# Patient Record
Sex: MALE | Race: WHITE | NOT HISPANIC OR LATINO
[De-identification: names, ages, dates, MRNs, and addresses within clinical notes are randomized per-mention and may not be internally consistent; named-entity substitution may affect disease eponyms.]

---

## 2022-03-22 ENCOUNTER — TRANSCRIPTION ENCOUNTER (OUTPATIENT)
Age: 22
End: 2022-03-22

## 2024-03-05 ENCOUNTER — APPOINTMENT (OUTPATIENT)
Dept: INTERNAL MEDICINE | Facility: CLINIC | Age: 24
End: 2024-03-05
Payer: COMMERCIAL

## 2024-03-05 DIAGNOSIS — F42.9 OBSESSIVE-COMPULSIVE DISORDER, UNSPECIFIED: ICD-10-CM

## 2024-03-05 DIAGNOSIS — F41.9 ANXIETY DISORDER, UNSPECIFIED: ICD-10-CM

## 2024-03-05 DIAGNOSIS — F32.A ANXIETY DISORDER, UNSPECIFIED: ICD-10-CM

## 2024-03-05 PROBLEM — Z00.00 ENCOUNTER FOR PREVENTIVE HEALTH EXAMINATION: Status: ACTIVE | Noted: 2024-03-05

## 2024-03-05 PROCEDURE — 99203 OFFICE O/P NEW LOW 30 MIN: CPT

## 2024-03-05 RX ORDER — BUPROPION HYDROCHLORIDE 150 MG/1
150 TABLET, EXTENDED RELEASE ORAL DAILY
Qty: 30 | Refills: 3 | Status: ACTIVE | COMMUNITY
Start: 2024-03-05 | End: 1900-01-01

## 2024-03-05 RX ORDER — FLUOXETINE HYDROCHLORIDE 60 MG/1
60 TABLET ORAL
Qty: 30 | Refills: 0 | Status: ACTIVE | COMMUNITY
Start: 2024-03-05

## 2024-03-05 NOTE — HISTORY OF PRESENT ILLNESS
[Home] : at home, [unfilled] , at the time of the visit. [Medical Office: ([facility])___] : at the medical office located in  [Verbal consent obtained from patient] : the patient, [unfilled] [FreeTextEntry1] : Looking to establish care  Needs form filled out for school [de-identified] : Otherwise well, no complaints  -------------------------------------------------------------------------   10-minute telehealth encounter

## 2024-03-07 ENCOUNTER — TRANSCRIPTION ENCOUNTER (OUTPATIENT)
Age: 24
End: 2024-03-07

## 2024-03-21 ENCOUNTER — OUTPATIENT (OUTPATIENT)
Dept: OUTPATIENT SERVICES | Facility: HOSPITAL | Age: 24
LOS: 1 days | Discharge: STILL ADMITTED OR ADMINISTRATIVE | End: 2024-03-21

## 2024-03-22 ENCOUNTER — INPATIENT (INPATIENT)
Facility: HOSPITAL | Age: 24
LOS: 4 days | Discharge: HOME | End: 2024-03-27
Attending: STUDENT IN AN ORGANIZED HEALTH CARE EDUCATION/TRAINING PROGRAM | Admitting: STUDENT IN AN ORGANIZED HEALTH CARE EDUCATION/TRAINING PROGRAM
Payer: COMMERCIAL

## 2024-03-22 VITALS
HEART RATE: 83 BPM | DIASTOLIC BLOOD PRESSURE: 81 MMHG | OXYGEN SATURATION: 99 % | RESPIRATION RATE: 16 BRPM | TEMPERATURE: 98 F | SYSTOLIC BLOOD PRESSURE: 147 MMHG

## 2024-03-22 DIAGNOSIS — F32.9 MAJOR DEPRESSIVE DISORDER, SINGLE EPISODE, UNSPECIFIED: ICD-10-CM

## 2024-03-22 DIAGNOSIS — F42.2 MIXED OBSESSIONAL THOUGHTS AND ACTS: ICD-10-CM

## 2024-03-22 LAB
ALBUMIN SERPL ELPH-MCNC: 4.7 G/DL — SIGNIFICANT CHANGE UP (ref 3.3–5)
ALP SERPL-CCNC: 93 U/L — SIGNIFICANT CHANGE UP (ref 40–120)
ALT FLD-CCNC: 23 U/L — SIGNIFICANT CHANGE UP (ref 4–41)
AMPHET UR-MCNC: NEGATIVE — SIGNIFICANT CHANGE UP
ANION GAP SERPL CALC-SCNC: 14 MMOL/L — SIGNIFICANT CHANGE UP (ref 7–14)
APAP SERPL-MCNC: <10 UG/ML — LOW (ref 15–25)
APPEARANCE UR: ABNORMAL
AST SERPL-CCNC: 23 U/L — SIGNIFICANT CHANGE UP (ref 4–40)
BACTERIA # UR AUTO: NEGATIVE /HPF — SIGNIFICANT CHANGE UP
BARBITURATES UR SCN-MCNC: NEGATIVE — SIGNIFICANT CHANGE UP
BASOPHILS # BLD AUTO: 0.06 K/UL — SIGNIFICANT CHANGE UP (ref 0–0.2)
BASOPHILS NFR BLD AUTO: 0.6 % — SIGNIFICANT CHANGE UP (ref 0–2)
BENZODIAZ UR-MCNC: NEGATIVE — SIGNIFICANT CHANGE UP
BILIRUB SERPL-MCNC: 0.8 MG/DL — SIGNIFICANT CHANGE UP (ref 0.2–1.2)
BILIRUB UR-MCNC: NEGATIVE — SIGNIFICANT CHANGE UP
BUN SERPL-MCNC: 10 MG/DL — SIGNIFICANT CHANGE UP (ref 7–23)
CALCIUM SERPL-MCNC: 9.8 MG/DL — SIGNIFICANT CHANGE UP (ref 8.4–10.5)
CAST: 0 /LPF — SIGNIFICANT CHANGE UP (ref 0–4)
CHLORIDE SERPL-SCNC: 104 MMOL/L — SIGNIFICANT CHANGE UP (ref 98–107)
CO2 SERPL-SCNC: 22 MMOL/L — SIGNIFICANT CHANGE UP (ref 22–31)
COCAINE METAB.OTHER UR-MCNC: NEGATIVE — SIGNIFICANT CHANGE UP
COLOR SPEC: YELLOW — SIGNIFICANT CHANGE UP
CREAT SERPL-MCNC: 1.11 MG/DL — SIGNIFICANT CHANGE UP (ref 0.5–1.3)
CREATININE URINE RESULT, DAU: 229 MG/DL — SIGNIFICANT CHANGE UP
DIFF PNL FLD: NEGATIVE — SIGNIFICANT CHANGE UP
EGFR: 97 ML/MIN/1.73M2 — SIGNIFICANT CHANGE UP
EOSINOPHIL # BLD AUTO: 0.05 K/UL — SIGNIFICANT CHANGE UP (ref 0–0.5)
EOSINOPHIL NFR BLD AUTO: 0.5 % — SIGNIFICANT CHANGE UP (ref 0–6)
ETHANOL SERPL-MCNC: <10 MG/DL — SIGNIFICANT CHANGE UP
FLUAV AG NPH QL: SIGNIFICANT CHANGE UP
FLUBV AG NPH QL: SIGNIFICANT CHANGE UP
GLUCOSE SERPL-MCNC: 120 MG/DL — HIGH (ref 70–99)
GLUCOSE UR QL: NEGATIVE MG/DL — SIGNIFICANT CHANGE UP
HCG SERPL-ACNC: <1 MIU/ML — SIGNIFICANT CHANGE UP
HCT VFR BLD CALC: 41 % — SIGNIFICANT CHANGE UP (ref 39–50)
HGB BLD-MCNC: 13.8 G/DL — SIGNIFICANT CHANGE UP (ref 13–17)
IANC: 6.99 K/UL — SIGNIFICANT CHANGE UP (ref 1.8–7.4)
IMM GRANULOCYTES NFR BLD AUTO: 0.4 % — SIGNIFICANT CHANGE UP (ref 0–0.9)
KETONES UR-MCNC: NEGATIVE MG/DL — SIGNIFICANT CHANGE UP
LEUKOCYTE ESTERASE UR-ACNC: NEGATIVE — SIGNIFICANT CHANGE UP
LYMPHOCYTES # BLD AUTO: 1.9 K/UL — SIGNIFICANT CHANGE UP (ref 1–3.3)
LYMPHOCYTES # BLD AUTO: 19.5 % — SIGNIFICANT CHANGE UP (ref 13–44)
MCHC RBC-ENTMCNC: 30.5 PG — SIGNIFICANT CHANGE UP (ref 27–34)
MCHC RBC-ENTMCNC: 33.7 GM/DL — SIGNIFICANT CHANGE UP (ref 32–36)
MCV RBC AUTO: 90.7 FL — SIGNIFICANT CHANGE UP (ref 80–100)
METHADONE UR-MCNC: NEGATIVE — SIGNIFICANT CHANGE UP
MONOCYTES # BLD AUTO: 0.7 K/UL — SIGNIFICANT CHANGE UP (ref 0–0.9)
MONOCYTES NFR BLD AUTO: 7.2 % — SIGNIFICANT CHANGE UP (ref 2–14)
NEUTROPHILS # BLD AUTO: 6.99 K/UL — SIGNIFICANT CHANGE UP (ref 1.8–7.4)
NEUTROPHILS NFR BLD AUTO: 71.8 % — SIGNIFICANT CHANGE UP (ref 43–77)
NITRITE UR-MCNC: NEGATIVE — SIGNIFICANT CHANGE UP
NRBC # BLD: 0 /100 WBCS — SIGNIFICANT CHANGE UP (ref 0–0)
NRBC # FLD: 0 K/UL — SIGNIFICANT CHANGE UP (ref 0–0)
OPIATES UR-MCNC: NEGATIVE — SIGNIFICANT CHANGE UP
OXYCODONE UR-MCNC: NEGATIVE — SIGNIFICANT CHANGE UP
PCP SPEC-MCNC: SIGNIFICANT CHANGE UP
PCP UR-MCNC: NEGATIVE — SIGNIFICANT CHANGE UP
PH UR: 7.5 — SIGNIFICANT CHANGE UP (ref 5–8)
PLATELET # BLD AUTO: 208 K/UL — SIGNIFICANT CHANGE UP (ref 150–400)
POTASSIUM SERPL-MCNC: 4.2 MMOL/L — SIGNIFICANT CHANGE UP (ref 3.5–5.3)
POTASSIUM SERPL-SCNC: 4.2 MMOL/L — SIGNIFICANT CHANGE UP (ref 3.5–5.3)
PROT SERPL-MCNC: 7.8 G/DL — SIGNIFICANT CHANGE UP (ref 6–8.3)
PROT UR-MCNC: SIGNIFICANT CHANGE UP MG/DL
RBC # BLD: 4.52 M/UL — SIGNIFICANT CHANGE UP (ref 4.2–5.8)
RBC # FLD: 12.4 % — SIGNIFICANT CHANGE UP (ref 10.3–14.5)
RBC CASTS # UR COMP ASSIST: 2 /HPF — SIGNIFICANT CHANGE UP (ref 0–4)
RSV RNA NPH QL NAA+NON-PROBE: SIGNIFICANT CHANGE UP
SALICYLATES SERPL-MCNC: <0.3 MG/DL — LOW (ref 15–30)
SARS-COV-2 RNA SPEC QL NAA+PROBE: SIGNIFICANT CHANGE UP
SODIUM SERPL-SCNC: 140 MMOL/L — SIGNIFICANT CHANGE UP (ref 135–145)
SP GR SPEC: 1.03 — SIGNIFICANT CHANGE UP (ref 1–1.03)
SQUAMOUS # UR AUTO: 1 /HPF — SIGNIFICANT CHANGE UP (ref 0–5)
THC UR QL: POSITIVE
TOXICOLOGY SCREEN, DRUGS OF ABUSE, SERUM RESULT: SIGNIFICANT CHANGE UP
TSH SERPL-MCNC: 0.9 UIU/ML — SIGNIFICANT CHANGE UP (ref 0.27–4.2)
UROBILINOGEN FLD QL: 0.2 MG/DL — SIGNIFICANT CHANGE UP (ref 0.2–1)
WBC # BLD: 9.74 K/UL — SIGNIFICANT CHANGE UP (ref 3.8–10.5)
WBC # FLD AUTO: 9.74 K/UL — SIGNIFICANT CHANGE UP (ref 3.8–10.5)
WBC UR QL: 1 /HPF — SIGNIFICANT CHANGE UP (ref 0–5)

## 2024-03-22 PROCEDURE — 99285 EMERGENCY DEPT VISIT HI MDM: CPT

## 2024-03-22 PROCEDURE — 99285 EMERGENCY DEPT VISIT HI MDM: CPT | Mod: GC

## 2024-03-22 RX ORDER — FLUOXETINE HCL 10 MG
60 CAPSULE ORAL DAILY
Refills: 0 | Status: DISCONTINUED | OUTPATIENT
Start: 2024-03-22 | End: 2024-03-22

## 2024-03-22 RX ORDER — CLONAZEPAM 1 MG
1 TABLET ORAL ONCE
Refills: 0 | Status: DISCONTINUED | OUTPATIENT
Start: 2024-03-22 | End: 2024-03-22

## 2024-03-22 RX ORDER — HYDROXYZINE HCL 10 MG
50 TABLET ORAL EVERY 6 HOURS
Refills: 0 | Status: DISCONTINUED | OUTPATIENT
Start: 2024-03-22 | End: 2024-03-27

## 2024-03-22 RX ORDER — CLONAZEPAM 1 MG
0.5 TABLET ORAL
Refills: 0 | Status: DISCONTINUED | OUTPATIENT
Start: 2024-03-22 | End: 2024-03-25

## 2024-03-22 RX ORDER — BUPROPION HYDROCHLORIDE 150 MG/1
150 TABLET, EXTENDED RELEASE ORAL DAILY
Refills: 0 | Status: DISCONTINUED | OUTPATIENT
Start: 2024-03-22 | End: 2024-03-22

## 2024-03-22 RX ADMIN — Medication 1 MILLIGRAM(S): at 10:20

## 2024-03-22 RX ADMIN — Medication 0.5 MILLIGRAM(S): at 22:01

## 2024-03-22 RX ADMIN — Medication 1 MILLIGRAM(S): at 13:56

## 2024-03-22 NOTE — BH PATIENT PROFILE - FUNCTIONAL ASSESSMENT - BASIC MOBILITY 4.
Patient coughing with medication administration crushed in sauce and a drink  Dr Rubi Muhammad notified via tiger text and made NPO until cleared by ST  Patient going to MRI now  4 = No assist / stand by assistance

## 2024-03-22 NOTE — BH PATIENT PROFILE - STATED REASON FOR ADMISSION
I am really scared,  I don't belong here, making it all up in my head, I want to be with my girlfriend, reports passive SI, AH, He reports he does not want these thoughts.

## 2024-03-22 NOTE — BH CHART NOTE - NSEVENTNOTEFT_PSY_ALL_CORE
Patient seen briefly by attending MD after transfer from [facility] ED, full admit to follow tomorrow, in brief patient was highly anxious, describing intrusive thoughts related to self harm, harm to others. He notes he was previously taking prozac 60mg and Wellbutrin 150mg for over a year, but then stopped for at least 1 month, he was taking these for anxiety/OCD. He notes that while off meds he felt entirely himself and had no real anxiety or intrusive thoughts of this type. He then went to psych urgent care and told the provider of his meds who then provided prescriptions at those doses, there was no titration he simply went from 0 to 60mg of prozac in one day. Since that time pt has been extremely anxious with poor sleep, and worsening intrusive thoughts. Pt given Klonopin 1mg dose to great effect, will start klonopin 0.5mg BID then consider starting low dose lexapro later next week. Prozac and wellbutrin have been dc. Pt denies SI, denies urges to self injure.

## 2024-03-22 NOTE — ED ADULT NURSE NOTE - NSFALLUNIVINTERV_ED_ALL_ED
Bed/Stretcher in lowest position, wheels locked, appropriate side rails in place/Call bell, personal items and telephone in reach/Instruct patient to call for assistance before getting out of bed/chair/stretcher/Non-slip footwear applied when patient is off stretcher/[place] to call system/Physically safe environment - no spills, clutter or unnecessary equipment/Purposeful proactive rounding/Room/bathroom lighting operational, light cord in reach

## 2024-03-22 NOTE — ED ADULT TRIAGE NOTE - CHIEF COMPLAINT QUOTE
Pt arrives ambulatory to triage c/o suicidal ideation x1 wk. States they worsened since re-starting buproprion and fluoxetine 1.5 wks ago after being off these meds for 1 month. Recurrent thoughts about cutting self, shooting self (no firearm access). + recurrent thoughts about harming girlfriend (states he doesn't want to act on these thoughts). Denies hallucinations. + ETOH use socially. + marijuana use. Appears tearful in triage. PMHx: OCD, RICK, MDD.

## 2024-03-22 NOTE — ED PROVIDER NOTE - CLINICAL SUMMARY MEDICAL DECISION MAKING FREE TEXT BOX
Patient presents emergency department with severe worsening Isr[place] with intrusive thoughts said he wants to hurt others and himself although states very unlikely.  Patient states he never hurt in the past with no auditory visualizations secondary to not receiving his meds for over a month.  Patient to be voluntarily admitted for inpatient care.

## 2024-03-22 NOTE — ED BEHAVIORAL HEALTH ASSESSMENT NOTE - HPI (INCLUDE ILLNESS QUALITY, SEVERITY, DURATION, TIMING, CONTEXT, MODIFYING FACTORS, ASSOCIATED SIGNS AND SYMPTOMS)
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     Patient inconsolably crying and distress during the interview. Patient reports being diagnosed from OCD since age 9y/o. He reports being in treatment since then with Prozac, which has been prescribed by PCP who recently retired and was unable to renew prescription. Patient states not being in medication for a 1.5 months, he initially reports feeling well but in Saturday started to present symptoms. Patient reports since Saturday he started to experience intrusive violent images about stabbing/cutting self and family (mother and girlfriend) as well as shooting them with a gun (which denies having) after watching in the new a case were a  killed his wife. Patient reports these images happen most of the time in the day and are severely distressing to him as these "do not represent who he is" and is very scared. Patient states he has been isolating himself from friends, breathing and cracking fingers to get distracted from images. He reports feeling sad, anxious, not enjoying things as before, not going to school since Monday or to football practices. He reports poor sleep as he has been waking up with anxiety related to the thoughts and has poor appetite. Patient also reports passive thoughts about ending his life but states knowing "this is not the solution" and would never to anything to hurt himself or others. Patient denies any AH/VH, paranoia or referential thinking.     Collateral [place]: She reports patient goes to [employer] and has a scholarship in football. She reports patient was diagnosed of OCD, depression, anxiety when he was 9 y/o and was following up with pediatrician. She reports patient used to have obsessions related to numbers and patterns but now intrusive thoughts change depending on the situation. She reports being originally from [place], and states pediatrician retired after following patient for 20 years. She state patient stopped medication 4-5 weeks, but when back 1.5 weeks ago. She states patient is on Prozac 60mg PO and Wellbutrin (unclear about the dose). She reports on Monday, he called her stating he was having thoughts, fearing hurting someone (particularly girlfriend) or self, not being able to function in school or job. She states patient has been stating being scared and not fully trusting self, but denies any passive or active SI thoughts. Last episode during Covid, but did not went to the ED. Patient was never being admitted to IPP unit before. She states patient has an appointment with a psychiatrist Dr. Alexandra Olvera this Wednesday.

## 2024-03-22 NOTE — ED BEHAVIORAL HEALTH ASSESSMENT NOTE - DESCRIPTION
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 When is at home lives with mother and mom's boyfriend

## 2024-03-22 NOTE — ED BEHAVIORAL HEALTH ASSESSMENT NOTE - VIOLENCE PROTECTIVE FACTORS:
Residential stability/Relationship stability/Employment stability/Engagement in treatment/Affective Stability/Insight into violence risk and need for management/treatment

## 2024-03-22 NOTE — ED BEHAVIORAL HEALTH ASSESSMENT NOTE - PSYCHIATRIC ISSUES AND PLAN (INCLUDE STANDING AND PRN MEDICATION)
continue with home medications Prozac 60mg PO daily and Wellbutrin 150mg PO daily. Atarax 50mg PO q6 for anxiety, Ativan 2mg PO/IM q6 for severe anxiety/agitation.

## 2024-03-22 NOTE — ED ADULT NURSE NOTE - OBJECTIVE STATEMENT
Received pt in BH pt tearful c/o depression & instructive thoughts currently denies si/hi emotional support provided labs/covid collected safety & comfort measures maintained eval on going.

## 2024-03-22 NOTE — ED BEHAVIORAL HEALTH ASSESSMENT NOTE - RISK ASSESSMENT
Acute Risk: depressed mood/anhedonia, hopelessness/despair, severe anxiety,  panic,  global insomnia, change in provider or treatment,    Chronic Risk: mood disorder, substance use disorder    Protective factors include: Young, healthy, denies SI/I/P, no history of suicide attempts, no history of NSSIB, identifies reasons for living, fear of death/dying due to pain/suffering, future oriented, no prior psychiatric admissions, engaged in work or school, stable housing, strong social supports, engaged in treatment, ability to cope with stress, high frustration tolerance, medication/follow up compliance, help-seeking behaviors, no legal history, adequate outpatient follow up with motivation to participate in care.

## 2024-03-22 NOTE — ED BEHAVIORAL HEALTH ASSESSMENT NOTE - SUMMARY
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     Patient presenting violent intrusive thoughts of hurting self and family with depressive and anxious mood for the last week in the context of medication discontinuation. Patient re started medication 1.5 weeks ago; however continues to reports distressing symptoms causing poor functioning. Patient reports passive SI though denies any plan or intent. He also denies intent to follow intrusive thoughts. Patient meets criteria for voluntary admission. Patient is a 22 y/o single male, college student in [employer], living at the dorms in college, with past psychiatric history of OCD, MDD and anxiety, no past inpatient admission, no past SA/NSSIB, currently in treatment with PCP but not psychiatrist, use of daily MJ, no past legal or violent history, no trauma history, no significant medical history who BIB self after worsening OCD symptoms for the last week in the context of recent discontinuation of medication.     Patient presenting violent intrusive thoughts of hurting self and family with depressive and anxious mood for the last week in the context of medication discontinuation. Patient re started medication 1.5 weeks ago; however continues to reports distressing symptoms causing poor functioning. Patient reports passive SI though denies any plan or intent. He also denies intent to follow intrusive thoughts. Patient meets criteria for voluntary admission.    PLAN:  - Admit patient 9.13   - No CO 1:1  - psychiatry: continue with home medications Prozac 60mg PO daily and Wellbutrin 150mg PO daily  - PRNs: Atarax 50mg PO q6 for anxiety, Ativan 2mg PO/IM q6 for severe anxiety/agitation.

## 2024-03-22 NOTE — ED BEHAVIORAL HEALTH ASSESSMENT NOTE - NSBHATTESTCOMMENTATTENDFT_PSY_A_CORE
Patient is a 22 y/o single male, college student in [employer], living at the dorms in college, with past psychiatric history of OCD, MDD and anxiety, no past inpatient admission, no past SA/NSSIB, currently in treatment with PCP but not psychiatrist, use of daily MJ, no past legal or violent history, no trauma history, no significant medical history who BIB self after worsening OCD symptoms for the last week in the context of recent discontinuation of medication.     Patient presenting violent intrusive thoughts of hurting self and family with depressive and anxious mood for the last week in the context of medication discontinuation. Patient re started medication 1.5 weeks ago; however continues to reports distressing symptoms causing poor functioning. Patient reports passive SI though denies any plan or intent. He also denies intent to follow intrusive thoughts. Patient meets criteria for voluntary admission.

## 2024-03-22 NOTE — ED BEHAVIORAL HEALTH ASSESSMENT NOTE - NSBHMSEIMPULSE_PSY_A_CORE
Spoke to Sindhu, medication is not covered at [employer]. Script will need to be sent to [facility]. See telephone encounter 03/16/2021, PA already approved.        To initiate an authorization request for this medication, please contact [facility] at [phone] [place]

## 2024-03-22 NOTE — ED BEHAVIORAL HEALTH ASSESSMENT NOTE - DETAILS
mother OCD on Prozac and Wellbutrin and father MDD see HPI DARRENM to Dr. Fredis MEJIA Patient admitted to [facility]

## 2024-03-22 NOTE — ED PROVIDER NOTE - OBJECTIVE STATEMENT
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     Patient inconsolably crying and distress during the interview. Patient reports being diagnosed from OCD since age 11y/o. He reports being in treatment since then with Prozac, which has been prescribed by PCP who recently retired and was unable to renew prescription. Patient states not being in medication for a 1.5 months, he initially reports feeling well but in Saturday started to present symptoms. Patient reports since Saturday he started to experience intrusive violent images about stabbing/cutting self and family (mother and girlfriend) as well as shooting them with a gun (which denies having) after watching in the new a case were a  killed his wife. Patient reports these images happen most of the time in the day and are severely distressing to him as these "do not represent who he is" and is very scared. Patient states he has been isolating himself from friends, breathing and cracking fingers to get distracted from images. He reports feeling sad, anxious, not enjoying things as before, not going to school since Monday or to football practices. He reports poor sleep as he has been waking up with anxiety related to the thoughts and has poor appetite. Patient also reports passive thoughts about ending his life but states knowing "this is not the solution" and would never to anything to hurt himself or others. Patient denies any AH/VH, paranoia or referential thinking.     Collateral [place]: She reports patient goes to [employer] and has a scholarship in football. She reports patient was diagnosed of OCD, depression, anxiety when he was 9 y/o and was following up with pediatrician. She reports patient used to have obsessions related to numbers and patterns but now intrusive thoughts change depending on the situation. She reports being originally from [place], and states pediatrician retired after following patient for 20 years. She state patient stopped medication 4-5 weeks, but when back 1.5 weeks ago. She states patient is on Prozac 60mg PO and Wellbutrin (unclear about the dose). She reports on Monday, he called her stating he was having thoughts, fearing hurting someone (particularly girlfriend) or self, not being able to function in school or job. She states patient has been stating being scared and not fully trusting self, but denies any passive or active SI thoughts. Last episode during Covid, but did not went to the ED. Patient was never being admitted to IPP unit before. She states patient has an appointment with a psychiatrist Dr. Alexandra Olvera this Wednesday.

## 2024-03-22 NOTE — BH PATIENT PROFILE - FALL HARM RISK - UNIVERSAL INTERVENTIONS
Bed in lowest position, wheels locked, appropriate side rails in place/Call bell, personal items and telephone in reach/Instruct patient to call for assistance before getting out of bed or chair/Non-slip footwear when patient is out of bed/[place] to call system/Physically safe environment - no spills, clutter or unnecessary equipment/Purposeful Proactive Rounding/Room/bathroom lighting operational, light cord in reach

## 2024-03-23 PROCEDURE — 99221 1ST HOSP IP/OBS SF/LOW 40: CPT

## 2024-03-23 RX ADMIN — Medication 0.5 MILLIGRAM(S): at 08:01

## 2024-03-23 RX ADMIN — Medication 0.5 MILLIGRAM(S): at 21:05

## 2024-03-23 RX ADMIN — Medication 2 MILLIGRAM(S): at 10:19

## 2024-03-23 RX ADMIN — Medication 50 MILLIGRAM(S): at 21:05

## 2024-03-23 NOTE — BH INPATIENT PSYCHIATRY ASSESSMENT NOTE - NSBHMETABOLIC_PSY_ALL_CORE_FT
BMI:   HbA1c:   Glucose:   BP: 149/85 (03-22-24 @ 12:08) (147/81 - 149/85)Vital Signs Last 24 Hrs  T(C): 36.7 (03-23-24 @ 08:40), Max: 36.7 (03-23-24 @ 08:40)  T(F): 98 (03-23-24 @ 08:40), Max: 98 (03-23-24 @ 08:40)  HR: --  BP: --  BP(mean): --  RR: 18 (03-23-24 @ 08:40) (18 - 18)  SpO2: --    Orthostatic VS  03-23-24 @ 08:40  Lying BP: --/-- HR: --  Sitting BP: 133/80 HR: 98  Standing BP: 127/88 HR: 97  Site: upper right arm  Mode: electronic  Orthostatic VS  03-22-24 @ 14:06  Lying BP: --/-- HR: --  Sitting BP: 131/89 HR: 82  Standing BP: 140/95 HR: 89  Site: --  Mode: --    Lipid Panel:  BMI:   HbA1c:   Glucose:   BP: 149/85 (03-22-24 @ 12:08) (147/81 - 149/85)Vital Signs Last 24 Hrs  T(C): 37 (03-23-24 @ 20:32), Max: 37 (03-23-24 @ 20:32)  T(F): 98.6 (03-23-24 @ 20:32), Max: 98.6 (03-23-24 @ 20:32)  HR: --  BP: --  BP(mean): --  RR: 18 (03-23-24 @ 08:40) (18 - 18)  SpO2: --    Orthostatic VS  03-23-24 @ 08:40  Lying BP: --/-- HR: --  Sitting BP: 133/80 HR: 98  Standing BP: 127/88 HR: 97  Site: upper right arm  Mode: electronic  Orthostatic VS  03-22-24 @ 14:06  Lying BP: --/-- HR: --  Sitting BP: 131/89 HR: 82  Standing BP: 140/95 HR: 89  Site: --  Mode: --    Lipid Panel:

## 2024-03-23 NOTE — BH INPATIENT PSYCHIATRY ASSESSMENT NOTE - NSBHCRANIAL_PSY_ALL_CORE
Recognizes 2 fingers or can read (II)/Normal speech (IX, X, XII)/Extraocular Eye Movement Intact  (III, IV, VI)/Shoulder shrug (XI)/Hearing intact (VIII)

## 2024-03-23 NOTE — BH INPATIENT PSYCHIATRY ASSESSMENT NOTE - DESCRIPTION
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 When is at home lives with mother and mom's boyfriend

## 2024-03-23 NOTE — BH INPATIENT PSYCHIATRY ASSESSMENT NOTE - HPI (INCLUDE ILLNESS QUALITY, SEVERITY, DURATION, TIMING, CONTEXT, MODIFYING FACTORS, ASSOCIATED SIGNS AND SYMPTOMS)
Patient is a 22 y/o single male, college student in [employer], living at the dorms in college, with past psychiatric history of OCD, MDD and anxiety, no past inpatient admission, no past SA/NSSIB, currently in treatment with PCP but not psychiatrist, use of daily MJ, no past legal or violent history, no trauma history, no significant medical history who BIB self after worsening OCD symptoms for the last week in the context of recent discontinuation of medication.     Patient inconsolably crying and distress during the interview. Patient reports being diagnosed from OCD since age 9y/o. He reports being in treatment since then with Prozac, which has been prescribed by PCP who recently retired and was unable to renew prescription. Patient states not being in medication for a 1.5 months, he initially reports feeling well but in Saturday started to present symptoms. Patient reports since Saturday he started to experience intrusive violent images about stabbing/cutting self and family (mother and girlfriend) as well as shooting them with a gun (which denies having) after watching in the new a case were a  killed his wife. Patient reports these images happen most of the time in the day and are severely distressing to him as these "do not represent who he is" and is very scared. Patient states he has been isolating himself from friends, breathing and cracking fingers to get distracted from images. He reports feeling sad, anxious, not enjoying things as before, not going to school since Monday or to football practices. He reports poor sleep as he has been waking up with anxiety related to the thoughts and has poor appetite. Patient also reports passive thoughts about ending his life but states knowing "this is not the solution" and would never to anything to hurt himself or others. Patient denies any AH/VH, paranoia or referential thinking.     Collateral [place]: She reports patient goes to [employer] and has a scholarship in football. She reports patient was diagnosed of OCD, depression, anxiety when he was 9 y/o and was following up with pediatrician. She reports patient used to have obsessions related to numbers and patterns but now intrusive thoughts change depending on the situation. She reports being originally from [place], and states pediatrician retired after following patient for 20 years. She state patient stopped medication 4-5 weeks, but when back 1.5 weeks ago. She states patient is on Prozac 60mg PO and Wellbutrin (unclear about the dose). She reports on Monday, he called her stating he was having thoughts, fearing hurting someone (particularly girlfriend) or self, not being able to function in school or job. She states patient has been stating being scared and not fully trusting self, but denies any passive or active SI thoughts. Last episode during Covid, but did not went to the ED. Patient was never being admitted to IPP unit before. She states patient has an appointment with a psychiatrist Dr. Alexandra Olvera this Wednesday. As per  ED eval:  "Patient is a 24 y/o single male, college student in [employer], living at the dorms in college, with past psychiatric history of OCD, MDD and anxiety, no past inpatient admission, no past SA/NSSIB, currently in treatment with PCP but not psychiatrist, use of daily MJ, no past legal or violent history, no trauma history, no significant medical history who BIB self after worsening OCD symptoms for the last week in the context of recent discontinuation of medication.     Patient inconsolably crying and distress during the interview. Patient reports being diagnosed from OCD since age 11y/o. He reports being in treatment since then with Prozac, which has been prescribed by PCP who recently retired and was unable to renew prescription. Patient states not being in medication for a 1.5 months, he initially reports feeling well but in Saturday started to present symptoms. Patient reports since Saturday he started to experience intrusive violent images about stabbing/cutting self and family (mother and girlfriend) as well as shooting them with a gun (which denies having) after watching in the new a case were a  killed his wife. Patient reports these images happen most of the time in the day and are severely distressing to him as these "do not represent who he is" and is very scared. Patient states he has been isolating himself from friends, breathing and cracking fingers to get distracted from images. He reports feeling sad, anxious, not enjoying things as before, not going to school since Monday or to football practices. He reports poor sleep as he has been waking up with anxiety related to the thoughts and has poor appetite. Patient also reports passive thoughts about ending his life but states knowing "this is not the solution" and would never to anything to hurt himself or others. Patient denies any AH/VH, paranoia or referential thinking.     [place]ateral [place]: She reports patient goes to [employer] and has a scholarship in football. She reports patient was diagnosed of OCD, depression, anxiety when he was 9 y/o and was following up with pediatrician. She reports patient used to have obsessions related to numbers and patterns but now intrusive thoughts change depending on the situation. She reports being originally from [place], and states pediatrician retired after following patient for 20 years. She state patient stopped medication 4-5 weeks, but when back 1.5 weeks ago. She states patient is on Prozac 60mg PO and Wellbutrin (unclear about the dose). She reports on Monday, he called her stating he was having thoughts, fearing hurting someone (particularly girlfriend) or self, not being able to function in school or job. She states patient has been stating being scared and not fully trusting self, but denies any passive or active SI thoughts. Last episode during Covid, but did not went to the ED. Patient was never being admitted to IPP unit before. She states patient has an appointment with a psychiatrist Dr. Alexandra Olvera this Wednesday."    As per nursing, pt is extremely anxious and has had trouble tolerating this anxiety overnight.   On interview, the patient is found sitting on his bed stating that he's "scared." He says that the SI "pops up here and there" but has no intent or plan. He feels hopeless because "nothing helps." He denies any trouble with sleep or appetite. He's been considering breaking up with his girlfriend because of feeling overwhelmed.

## 2024-03-23 NOTE — PSYCHIATRIC REHAB INITIAL EVALUATION - NSBHPRRECOMMEND_PSY_ALL_CORE
Writer met with pt to orient him to the unit and to establish a collaborative psychiatric goal. Pt was receptive to speaking with writer and presented as anxious, distressed but with good insight. Pt reported reason for admission as “Since being off my medication a month ago I've been having thoughts that aren't like me. I would never hurt anyone or myself but my OCD makes me worry that I might do it.” Pt was encouraged to go to groups and try to replace OCD spiral thoughts with more mundane topics like football, focusing on positive elements of his identity, and engage in opposite action. Pt denied any SI currently and no SH/AH/VH. Writer collaborated with patient to select an appropriate psychiatric rehabilitation goal to reduce his distressing thoughts.  Psychiatric rehabilitation staff will continue to encourage and provide support to patient to reach their psychiatric goal.

## 2024-03-23 NOTE — BH INPATIENT PSYCHIATRY ASSESSMENT NOTE - NSBHASSESSSUMMFT_PSY_ALL_CORE
Patient is a 22 y/o single male, college student in [employer], living at the dorms in college, with past psychiatric history of OCD, MDD and anxiety, no past inpatient admission, no past SA/NSSIB, currently in treatment with PCP but not psychiatrist, use of daily MJ, no past legal or violent history, no trauma history, no significant medical history who BIB self after worsening OCD symptoms for the last week in the context of recent discontinuation of medication.     Patient presenting violent intrusive thoughts of hurting self and family with depressive and anxious mood for the last week in the context of medication discontinuation. Patient re started medication 1.5 weeks ago; however continues to reports distressing symptoms causing poor functioning. Patient reports passive SI though denies any plan or intent. He also denies intent to follow intrusive thoughts. Patient meets criteria for voluntary admission.    PLAN:  - Admit patient 9.13   - No CO 1:1  - psychiatry: continue to hold home meds. Klonopin BID, and pt will start lexapro.   - PRNs: Atarax 50mg PO q6 for anxiety, Ativan 2mg PO/IM q6 for severe anxiety/agitation.

## 2024-03-23 NOTE — BH INPATIENT PSYCHIATRY ASSESSMENT NOTE - CURRENT MEDICATION
MEDICATIONS  (STANDING):  clonazePAM  Tablet 0.5 milliGRAM(s) Oral two times a day    MEDICATIONS  (PRN):  hydrOXYzine hydrochloride 50 milliGRAM(s) Oral every 6 hours PRN anxiety  LORazepam     Tablet 2 milliGRAM(s) Oral every 6 hours PRN severe anxiety  LORazepam   Injectable 2 milliGRAM(s) IntraMuscular once PRN agitation

## 2024-03-23 NOTE — BH INPATIENT PSYCHIATRY ASSESSMENT NOTE - NSBHCHARTREVIEWLAB_PSY_A_CORE FT
13.8   9.74  )-----------( 208      ( 22 Mar 2024 10:26 )             41.0       03-22    140  |  104  |  10  ----------------------------<  120<H>  4.2   |  22  |  1.11    Ca    9.8      22 Mar 2024 10:26    TPro  7.8  /  Alb  4.7  /  TBili  0.8  /  DBili  x   /  AST  23  /  ALT  23  /  AlkPhos  93  03-22          Urinalysis Basic - ( 22 Mar 2024 10:26 )    Color: x / Appearance: x / SG: x / pH: x  Gluc: 120 mg/dL / Ketone: x  / Bili: x / Urobili: x   Blood: x / Protein: x / Nitrite: x   Leuk Esterase: x / RBC: x / WBC x   Sq Epi: x / Non Sq Epi: x / Bacteria: x

## 2024-03-23 NOTE — BH INPATIENT PSYCHIATRY ASSESSMENT NOTE - NSBHCHARTREVIEWVS_PSY_A_CORE FT
Vital Signs Last 24 Hrs  T(C): 36.7 (03-23-24 @ 08:40), Max: 36.7 (03-23-24 @ 08:40)  T(F): 98 (03-23-24 @ 08:40), Max: 98 (03-23-24 @ 08:40)  HR: --  BP: --  BP(mean): --  RR: 18 (03-23-24 @ 08:40) (18 - 18)  SpO2: --    Orthostatic VS  03-23-24 @ 08:40  Lying BP: --/-- HR: --  Sitting BP: 133/80 HR: 98  Standing BP: 127/88 HR: 97  Site: upper right arm  Mode: electronic  Orthostatic VS  03-22-24 @ 14:06  Lying BP: --/-- HR: --  Sitting BP: 131/89 HR: 82  Standing BP: 140/95 HR: 89  Site: --  Mode: --   Vital Signs Last 24 Hrs  T(C): 37 (03-23-24 @ 20:32), Max: 37 (03-23-24 @ 20:32)  T(F): 98.6 (03-23-24 @ 20:32), Max: 98.6 (03-23-24 @ 20:32)  HR: --  BP: --  BP(mean): --  RR: 18 (03-23-24 @ 08:40) (18 - 18)  SpO2: --    Orthostatic VS  03-23-24 @ 08:40  Lying BP: --/-- HR: --  Sitting BP: 133/80 HR: 98  Standing BP: 127/88 HR: 97  Site: upper right arm  Mode: electronic  Orthostatic VS  03-22-24 @ 14:06  Lying BP: --/-- HR: --  Sitting BP: 131/89 HR: 82  Standing BP: 140/95 HR: 89  Site: --  Mode: --

## 2024-03-23 NOTE — BH INPATIENT PSYCHIATRY ASSESSMENT NOTE - NSTXOBSCOMGOAL_PSY_ALL_CORE
Demonstrate use of a new coping technique to minimize the negative effects of obsessions and compulsions on socialization, activities and mood

## 2024-03-24 PROCEDURE — 99231 SBSQ HOSP IP/OBS SF/LOW 25: CPT

## 2024-03-24 RX ADMIN — Medication 0.5 MILLIGRAM(S): at 09:34

## 2024-03-24 RX ADMIN — Medication 0.5 MILLIGRAM(S): at 20:57

## 2024-03-24 RX ADMIN — Medication 50 MILLIGRAM(S): at 12:52

## 2024-03-25 PROBLEM — Z78.9 OTHER SPECIFIED HEALTH STATUS: Chronic | Status: ACTIVE | Noted: 2024-03-23

## 2024-03-25 PROCEDURE — 90853 GROUP PSYCHOTHERAPY: CPT

## 2024-03-25 PROCEDURE — 99232 SBSQ HOSP IP/OBS MODERATE 35: CPT

## 2024-03-25 RX ORDER — ESCITALOPRAM OXALATE 10 MG/1
5 TABLET, FILM COATED ORAL ONCE
Refills: 0 | Status: COMPLETED | OUTPATIENT
Start: 2024-03-25 | End: 2024-03-25

## 2024-03-25 RX ORDER — ESCITALOPRAM OXALATE 10 MG/1
5 TABLET, FILM COATED ORAL DAILY
Refills: 0 | Status: DISCONTINUED | OUTPATIENT
Start: 2024-03-26 | End: 2024-03-27

## 2024-03-25 RX ORDER — CLONAZEPAM 1 MG
0.5 TABLET ORAL AT BEDTIME
Refills: 0 | Status: DISCONTINUED | OUTPATIENT
Start: 2024-03-25 | End: 2024-03-27

## 2024-03-25 RX ADMIN — ESCITALOPRAM OXALATE 5 MILLIGRAM(S): 10 TABLET, FILM COATED ORAL at 12:57

## 2024-03-25 RX ADMIN — Medication 0.5 MILLIGRAM(S): at 20:51

## 2024-03-25 RX ADMIN — Medication 0.5 MILLIGRAM(S): at 08:58

## 2024-03-25 NOTE — BH SOCIAL WORK INITIAL PSYCHOSOCIAL EVALUATION - NSBHEDULITERACY_PSY_ALL_CORE
Medical Necessity Clause: This procedure was medically necessary because the lesions that were treated were: Able to read and write English

## 2024-03-26 DIAGNOSIS — F42.9 OBSESSIVE-COMPULSIVE DISORDER, UNSPECIFIED: ICD-10-CM

## 2024-03-26 PROCEDURE — 99231 SBSQ HOSP IP/OBS SF/LOW 25: CPT

## 2024-03-26 PROCEDURE — 90853 GROUP PSYCHOTHERAPY: CPT

## 2024-03-26 RX ORDER — CLONAZEPAM 1 MG
1 TABLET ORAL
Qty: 5 | Refills: 0
Start: 2024-03-26 | End: 2024-03-30

## 2024-03-26 RX ORDER — ESCITALOPRAM OXALATE 10 MG/1
1 TABLET, FILM COATED ORAL
Qty: 14 | Refills: 0
Start: 2024-03-26 | End: 2024-04-08

## 2024-03-26 RX ADMIN — ESCITALOPRAM OXALATE 5 MILLIGRAM(S): 10 TABLET, FILM COATED ORAL at 09:23

## 2024-03-26 RX ADMIN — Medication 0.5 MILLIGRAM(S): at 20:25

## 2024-03-26 NOTE — BH INPATIENT PSYCHIATRY PROGRESS NOTE - NSBHATTESTBILLING_PSY_A_CORE
94244-Fkmcakbwsx OBS or IP - low complexity OR 25-34 mins
98237-Hdcwzhnfst OBS or IP - moderate complexity OR 35-49 mins
05090-Chlnzjzppn OBS or IP - low complexity OR 25-34 mins

## 2024-03-26 NOTE — BH INPATIENT PSYCHIATRY PROGRESS NOTE - NSICDXBHPRIMARYDX_PSY_ALL_CORE
Mixed obsessional thoughts and acts   F42.2  

## 2024-03-26 NOTE — BH INPATIENT PSYCHIATRY PROGRESS NOTE - CURRENT MEDICATION
MEDICATIONS  (STANDING):  clonazePAM  Tablet 0.5 milliGRAM(s) Oral at bedtime  escitalopram 5 milliGRAM(s) Oral daily    MEDICATIONS  (PRN):  hydrOXYzine hydrochloride 50 milliGRAM(s) Oral every 6 hours PRN anxiety  LORazepam     Tablet 2 milliGRAM(s) Oral every 6 hours PRN severe anxiety  LORazepam   Injectable 2 milliGRAM(s) IntraMuscular once PRN agitation  
MEDICATIONS  (STANDING):  clonazePAM  Tablet 0.5 milliGRAM(s) Oral two times a day    MEDICATIONS  (PRN):  hydrOXYzine hydrochloride 50 milliGRAM(s) Oral every 6 hours PRN anxiety  LORazepam     Tablet 2 milliGRAM(s) Oral every 6 hours PRN severe anxiety  LORazepam   Injectable 2 milliGRAM(s) IntraMuscular once PRN agitation  
MEDICATIONS  (STANDING):  clonazePAM  Tablet 0.5 milliGRAM(s) Oral two times a day    MEDICATIONS  (PRN):  hydrOXYzine hydrochloride 50 milliGRAM(s) Oral every 6 hours PRN anxiety  LORazepam     Tablet 2 milliGRAM(s) Oral every 6 hours PRN severe anxiety  LORazepam   Injectable 2 milliGRAM(s) IntraMuscular once PRN agitation

## 2024-03-26 NOTE — BH PSYCHOLOGY - GROUP THERAPY NOTE - NSPSYCHOLGRPDBTPT_PSY_A_CORE FT
DBT Group is a group in which patients learn skills to better manage their emotions and behaviors. Group begins with a mindfulness practice so that patients have an opportunity to practice observing their internal and external experiences. Following the mindfulness exercise the group learns new skills in a didactic format. Group today focused on the “distress tolerance” module, which are skills to help patients manage their crisis urges, and not make their problems worse. Specifically, patients learned the “WISE MIND ACCEPTS” skill, which is an acronym for ways that patients can distract through activities, contributing, comparisons, pushing away, changing thoughts, and sensations. Patients were encouraged to think about examples of ways to utilize these skills while in the hospital and upon discharge 
DBT Group is a group in which patients learn skills to better manage their emotions and behaviors. Group begins with a mindfulness practice so that patients have an opportunity to practice observing their internal and external experiences.  Following the mindfulness exercise the group learns new skills in a didactic format. Group today focused on the “emotion regulation” module.  Specifically, patients learned about the skill of Problem Solving, which is a method of managing difficult situations when an emotion is justified by the situation.  Patients were taught the seven steps of problem solving and provided with an example of a situation in which the skill would be useful.  Patients were also asked to think about when the skill would be helpful in their lives and how to apply the steps. Patients were also provided with a worksheet in order to help them practice the skill.

## 2024-03-26 NOTE — BH INPATIENT PSYCHIATRY PROGRESS NOTE - NSBHFUPINTERVALHXFT_PSY_A_CORE
Chart reviewed case discussed w team. No acute events over the weekend, less anxious, no longer experiencing intrusive thoughts of harm to self or others, sleeping well, visible in milieu, wishes to start lexapro in observed setting so will start tx today with 1x order of 5mg and assess for re-emergence of sx. Will taper AM klonopin in anticipation of dc in near term.  
Chart reviewed case discussed w team. No acute events over night, patient continues to sustain improvements, denies current intrusive thoughts, notable anxiety or SI/HI. Tolerating lexapro 5mg well, given recent events leading to admit, will plan for slow titration to be completed by outpatient team. Pt engaged in therapy and safety planning, reports mood is good, wants to get back to routines including Football. Will change HS klonopin to PRN and aim for dc tomorrow to [facility]. 
Chart reviewed case discussed w nursing, pt reports he is feeling much better than previously but still with worries about recurrence of sx, more run of the mill OCD checking and reassurance seeking now, denies having continued intrusive thoughts of harm to self or others, adherent with klonopin and tolerating well. Sleeping well with good appetite.

## 2024-03-26 NOTE — BH PSYCHOLOGY - GROUP THERAPY NOTE - NSBHPSYCHOLDISCUSS_PSY_A_CORE
Discussion with collaborating staff took place since patient's last visit
Discussion with collaborating staff took place since patient's last visit
Yes

## 2024-03-26 NOTE — BH INPATIENT PSYCHIATRY PROGRESS NOTE - MSE UNSTRUCTURED FT
Appearance: Patient appears stated age, fair-grooming, fair hygiene, dressed casually.   Behavior: Cooperative during interview, appropriate eye contact.   Speech: Normal rate, productivity and tone.   Motor: No PMA/R.  Gait: normal, ambulating without issue  Mood: "good"   Affect: brighter, reactive, mood congruent.   Thought Process: Linear, goal directed  Thought Content: patient did not report active SI/ HI/VI, denies urges to self-injure.   Perceptual: Denies sensory disturbances.   Cognitive: aaox3, attention/concentration intact. Memory: intact   Insight: good  Judgement: good  Impulse control: intact on unit.
Appearance: Patient appears stated age, fair-grooming, fair hygiene, dressed casually.   Behavior: Cooperative during interview, appropriate eye contact.   Speech: Normal rate, productivity and tone.   Motor: No PMA/R.  Gait: normal, ambulating without issue  Mood: "better"   Affect: brighter, reactive, mood congruent.   Thought Process: Linear, goal directed  Thought Content: concerns re recurrence of sx, patient did not report active SI/ HI/VI, denies urges to self-injure.   Perceptual: Denies sensory disturbances.   Cognitive: aaox3, attention/concentration intact. Memory: intact   Insight: fair, improving  Judgement: fair  Impulse control: intact on unit.
Appearance: Patient appears stated age, fair-grooming, fair hygiene, dressed casually.   Behavior: Cooperative during interview, appropriate eye contact.   Speech: Normal rate, productivity and tone.   Motor: No PMA/R.  Gait: normal, ambulating without issue  Mood: "worried"   Affect: relieved but w some anxious elements, constricted, mood congruent.   Thought Process: Linear, goal directed  Thought Content: concerns re recurrence of sx, patient did not report active SI/ HI/VI, denies urges to self-injure.   Perceptual: Denies sensory disturbances.   Cognitive: aaox3, attention/concentration intact. Memory: intact   Insight: fair, improving  Judgement: fair  Impulse control: intact on unit.

## 2024-03-26 NOTE — BH INPATIENT PSYCHIATRY PROGRESS NOTE - NSBHCHARTREVIEWVS_PSY_A_CORE FT
Vital Signs Last 24 Hrs  T(C): 36.6 (03-24-24 @ 08:34), Max: 37 (03-23-24 @ 20:32)  T(F): 97.8 (03-24-24 @ 08:34), Max: 98.6 (03-23-24 @ 20:32)  HR: --  BP: --  BP(mean): --  RR: 16 (03-24-24 @ 08:34) (16 - 16)  SpO2: --    Orthostatic VS  03-24-24 @ 08:34  Lying BP: --/-- HR: --  Sitting BP: 133/79 HR: 69  Standing BP: 104/85 HR: 97  Site: --  Mode: --  Orthostatic VS  03-23-24 @ 08:40  Lying BP: --/-- HR: --  Sitting BP: 133/80 HR: 98  Standing BP: 127/88 HR: 97  Site: upper right arm  Mode: electronic  
Vital Signs Last 24 Hrs  T(C): 36.5 (03-25-24 @ 08:24), Max: 37 (03-24-24 @ 19:45)  T(F): 97.7 (03-25-24 @ 08:24), Max: 98.6 (03-24-24 @ 19:45)  HR: --  BP: --  BP(mean): --  RR: 18 (03-25-24 @ 08:24) (18 - 18)  SpO2: --    Orthostatic VS  03-25-24 @ 08:24  Lying BP: --/-- HR: --  Sitting BP: 137/89 HR: 79  Standing BP: 122/77 HR: 97  Site: --  Mode: --  Orthostatic VS  03-24-24 @ 08:34  Lying BP: --/-- HR: --  Sitting BP: 133/79 HR: 69  Standing BP: 104/85 HR: 97  Site: --  Mode: --  
Vital Signs Last 24 Hrs  T(C): 36.3 (03-26-24 @ 06:45), Max: 36.9 (03-25-24 @ 20:54)  T(F): 97.4 (03-26-24 @ 06:45), Max: 98.4 (03-25-24 @ 20:54)  HR: --  BP: --  BP(mean): --  RR: 18 (03-26-24 @ 06:45) (18 - 18)  SpO2: --    Orthostatic VS  03-26-24 @ 06:45  Lying BP: --/-- HR: --  Sitting BP: 132/76 HR: 60  Standing BP: 107/70 HR: 93  Site: --  Mode: --  Orthostatic VS  03-25-24 @ 08:24  Lying BP: --/-- HR: --  Sitting BP: 137/89 HR: 79  Standing BP: 122/77 HR: 97  Site: --  Mode: --

## 2024-03-26 NOTE — BH PSYCHOLOGY - CLINICIAN PSYCHOTHERAPY NOTE - NSBHPSYCHOLNARRATIVE_PSY_A_CORE FT
Writer met with patient for individual therapy session at the recommendation of the treatment team. Pt was alert, oriented x4 and cooperative with session. Pt denied SHIIP and AVH. Pt committed to safety on the unit. Pt reported mood as "feeling better - almost back to my old self" and affect was euthymic, congruent and full-range. No PMA or PMR was observed and speech was of normal rate, rhythm and volume. Pt's thought process was linear and goal-directed, and his thought content unremarkable. Pt was dressed casually, and presented w/ appropriate grooming and hygiene. Pt maintained appropriate eye contact. Impulse control was intact and pt's insight and judgment are limited.  Focus of session was on conducting behavior chain analysis on events leading to current hospitalization. Pt reported that he decided to "restart [his] Prozac on [his] own," and immediately resumed taking his previous dose, without any appropriate titration. Pt reported an immediate and subsequent increase in anxiety, including several cognitive and somatic anxiety sxs that culminated in "multiple panic attacks." Pt also reported having "intrusive, 'what if' thoughts about suicide" at the time that he attributed to his increase in anxiety. (Pt denied any current SI, active or passive.) Pt reported that his girlfriend brought him into the hospital d/t her concern for him, as well as his own fears about "what was happening to [him]." Pt and writer discussed this spike in anxiety, and writer introduced potential coping mechanisms pt might employ for coping w/ his intrusive thoughts. Writer also oriented pt to CAPS protocol and pt reaffirmed his commitment to safety on the unit. Pt expressed a desire for long-term therapy upon discharge, and expressed willingness to undergo psychological tx. Validation was provided and solution analysis implemented.     
Writer met with patient for individual therapy session at the recommendation of the treatment team. Pt was alert, oriented x4 and cooperative with session. Pt denied SHIIP and AVH. Pt committed to safety on the unit. Pt reported mood as "doing a lot better" and affect was euthymic, congruent and full-range. No PMA or PMR was observed and speech was of normal rate, rhythm and volume. Pt's thought process was linear and goal-directed, and his thought content unremarkable. Pt was dressed casually, and presented w/ appropriate grooming and hygiene. Pt maintained appropriate eye contact. Impulse control was intact and pt's insight and judgment are fair.  Focus of session was on generating safety plan and coping ahead for effective discharge. With encouragement, pt identified warning signs, coping skills, and external supports that he can use to maintain safety and stability outside of the hospital after discharge. Discussion also centered on how pt can help keep the environment safe. Please see  Safety Plan for further detail. The session also focused on consolidating treatment gains, terminating treatment relationship effectively, and coping ahead for effective discharge. Pt engaged in discussion of skills that he can use to manage transition home, including distress tolerance and nonjudgmental acceptance of his thoughts. Pt continued to express a desire and eagerness for long-term therapy upon discharge. Validation was provided and solution analysis implemented.

## 2024-03-26 NOTE — BH PSYCHOLOGY - CLINICIAN PSYCHOTHERAPY NOTE - NSBHPSYCHOLGOALS_PSY_A_CORE
Improve social/vocational/coping skills/Prevent relapse/Psychoeducation
Assessment/Improve social/vocational/coping skills/Psychoeducation

## 2024-03-26 NOTE — BH INPATIENT PSYCHIATRY PROGRESS NOTE - NSDCCRITERIA_PSY_ALL_CORE
cgi <3, remission of symptoms and return to baseline level of functioning

## 2024-03-26 NOTE — BH PSYCHOLOGY - CLINICIAN PSYCHOTHERAPY NOTE - TOKEN PULL-DIAGNOSIS
Primary Diagnosis:  Mixed obsessional thoughts and acts [F42.2]        Problem Dx:   Mixed obsessional thoughts and acts [F42.2]      
Primary Diagnosis:  Mixed obsessional thoughts and acts [F42.2]        Problem Dx:   Mixed obsessional thoughts and acts [F42.2]

## 2024-03-26 NOTE — BH PSYCHOLOGY - CLINICIAN PSYCHOTHERAPY NOTE - NSTXOBSCOMGOAL_PSY_ALL_CORE
Demonstrate use of a new coping technique to minimize the negative effects of obsessions and compulsions on socialization, activities and mood
Demonstrate use of a new coping technique to minimize the negative effects of obsessions and compulsions on socialization, activities and mood
DC instructions

## 2024-03-26 NOTE — BH PSYCHOLOGY - GROUP THERAPY NOTE - NSPSYCHOLGRPDBTPT_PSY_A_CORE
stable mood and affect in group/patient showing good behavior control/Patient able to identify mood states/other...
stable mood and affect in group/patient showing good behavior control/Patient able to identify mood states/other...

## 2024-03-26 NOTE — BH SAFETY PLAN - DISTRACTION PLACE 2
Approved, but he should come in for diabetic exam   Please call him and try to schedule an appointment 
Called and l/m for pt to callback and schedule a Dm appt as he does not have one scheduled   Per [employer]
Patient said that he talk with sav about schedule his appointment, that once he get the [facility] then he will call and make his appointment   So he did not want to do so now
Pt called back  I informed him to make an appt  He decided to hold off for now and will give us some glucose numbers after being on januvia  He wants Januvia called to [facility]  I ve called and cancelled prescription that was sent to [facility] 
The weight room at [facility].

## 2024-03-26 NOTE — BH INPATIENT PSYCHIATRY PROGRESS NOTE - PRN MEDS
MEDICATIONS  (PRN):  hydrOXYzine hydrochloride 50 milliGRAM(s) Oral every 6 hours PRN anxiety  LORazepam     Tablet 2 milliGRAM(s) Oral every 6 hours PRN severe anxiety  LORazepam   Injectable 2 milliGRAM(s) IntraMuscular once PRN agitation  

## 2024-03-26 NOTE — BH PSYCHOLOGY - GROUP THERAPY NOTE - NSPSYCHOLGRPDBTGOAL_PSY_A_CORE
reduce mood and affective lability/improve ability to indentify feelings/improve ability to communicate feelings/reduce vulnerability to emotional dysregualation
reduce mood and affective lability/improve ability to indentify feelings/improve ability to communicate feelings/reduce vulnerability to emotional dysregualation

## 2024-03-26 NOTE — BH INPATIENT PSYCHIATRY PROGRESS NOTE - NSBHMETABOLIC_PSY_ALL_CORE_FT
BMI:   HbA1c:   Glucose:   BP: --Vital Signs Last 24 Hrs  T(C): 36.3 (03-26-24 @ 06:45), Max: 36.9 (03-25-24 @ 20:54)  T(F): 97.4 (03-26-24 @ 06:45), Max: 98.4 (03-25-24 @ 20:54)  HR: --  BP: --  BP(mean): --  RR: 18 (03-26-24 @ 06:45) (18 - 18)  SpO2: --    Orthostatic VS  03-26-24 @ 06:45  Lying BP: --/-- HR: --  Sitting BP: 132/76 HR: 60  Standing BP: 107/70 HR: 93  Site: --  Mode: --  Orthostatic VS  03-25-24 @ 08:24  Lying BP: --/-- HR: --  Sitting BP: 137/89 HR: 79  Standing BP: 122/77 HR: 97  Site: --  Mode: --    Lipid Panel: 
BMI:   HbA1c:   Glucose:   BP: 149/85 (03-22-24 @ 12:08) (147/81 - 149/85)Vital Signs Last 24 Hrs  T(C): 36.6 (03-24-24 @ 08:34), Max: 37 (03-23-24 @ 20:32)  T(F): 97.8 (03-24-24 @ 08:34), Max: 98.6 (03-23-24 @ 20:32)  HR: --  BP: --  BP(mean): --  RR: 16 (03-24-24 @ 08:34) (16 - 16)  SpO2: --    Orthostatic VS  03-24-24 @ 08:34  Lying BP: --/-- HR: --  Sitting BP: 133/79 HR: 69  Standing BP: 104/85 HR: 97  Site: --  Mode: --  Orthostatic VS  03-23-24 @ 08:40  Lying BP: --/-- HR: --  Sitting BP: 133/80 HR: 98  Standing BP: 127/88 HR: 97  Site: upper right arm  Mode: electronic    Lipid Panel: 
BMI:   HbA1c:   Glucose:   BP: --Vital Signs Last 24 Hrs  T(C): 36.5 (03-25-24 @ 08:24), Max: 37 (03-24-24 @ 19:45)  T(F): 97.7 (03-25-24 @ 08:24), Max: 98.6 (03-24-24 @ 19:45)  HR: --  BP: --  BP(mean): --  RR: 18 (03-25-24 @ 08:24) (18 - 18)  SpO2: --    Orthostatic VS  03-25-24 @ 08:24  Lying BP: --/-- HR: --  Sitting BP: 137/89 HR: 79  Standing BP: 122/77 HR: 97  Site: --  Mode: --  Orthostatic VS  03-24-24 @ 08:34  Lying BP: --/-- HR: --  Sitting BP: 133/79 HR: 69  Standing BP: 104/85 HR: 97  Site: --  Mode: --    Lipid Panel:

## 2024-03-27 VITALS — TEMPERATURE: 98 F

## 2024-03-27 PROCEDURE — 99239 HOSP IP/OBS DSCHRG MGMT >30: CPT

## 2024-03-27 RX ADMIN — ESCITALOPRAM OXALATE 5 MILLIGRAM(S): 10 TABLET, FILM COATED ORAL at 08:18

## 2024-03-27 NOTE — BH DISCHARGE NOTE NURSING/SOCIAL WORK/PSYCH REHAB - NSDCPRRECOMMEND_PSY_ALL_CORE
Psychiatric rehabilitation staff recommends patient will benefit from attending outpatient treatment recommends by  for medication management, support, and psychotherapy

## 2024-03-27 NOTE — BH INPATIENT PSYCHIATRY DISCHARGE NOTE - HPI (INCLUDE ILLNESS QUALITY, SEVERITY, DURATION, TIMING, CONTEXT, MODIFYING FACTORS, ASSOCIATED SIGNS AND SYMPTOMS)
As per  ED eval:  "Patient is a 22 y/o single male, college student in [employer], living at the dorms in college, with past psychiatric history of OCD, MDD and anxiety, no past inpatient admission, no past SA/NSSIB, currently in treatment with PCP but not psychiatrist, use of daily MJ, no past legal or violent history, no trauma history, no significant medical history who BIB self after worsening OCD symptoms for the last week in the context of recent discontinuation of medication.     Patient inconsolably crying and distress during the interview. Patient reports being diagnosed from OCD since age 11y/o. He reports being in treatment since then with Prozac, which has been prescribed by PCP who recently retired and was unable to renew prescription. Patient states not being in medication for a 1.5 months, he initially reports feeling well but in Saturday started to present symptoms. Patient reports since Saturday he started to experience intrusive violent images about stabbing/cutting self and family (mother and girlfriend) as well as shooting them with a gun (which denies having) after watching in the new a case were a  killed his wife. Patient reports these images happen most of the time in the day and are severely distressing to him as these "do not represent who he is" and is very scared. Patient states he has been isolating himself from friends, breathing and cracking fingers to get distracted from images. He reports feeling sad, anxious, not enjoying things as before, not going to school since Monday or to football practices. He reports poor sleep as he has been waking up with anxiety related to the thoughts and has poor appetite. Patient also reports passive thoughts about ending his life but states knowing "this is not the solution" and would never to anything to hurt himself or others. Patient denies any AH/VH, paranoia or referential thinking.     Collateral [place]: She reports patient goes to [employer] and has a scholarship in football. She reports patient was diagnosed of OCD, depression, anxiety when he was 11 y/o and was following up with pediatrician. She reports patient used to have obsessions related to numbers and patterns but now intrusive thoughts change depending on the situation. She reports being originally from [place], and states pediatrician retired after following patient for 20 years. She state patient stopped medication 4-5 weeks, but when back 1.5 weeks ago. She states patient is on Prozac 60mg PO and Wellbutrin (unclear about the dose). She reports on Monday, he called her stating he was having thoughts, fearing hurting someone (particularly girlfriend) or self, not being able to function in school or job. She states patient has been stating being scared and not fully trusting self, but denies any passive or active SI thoughts. Last episode during Covid, but did not went to the ED. Patient was never being admitted to IPP unit before. She states patient has an appointment with a psychiatrist Dr. Alexandra Olvera this Wednesday."    As per nursing, pt is extremely anxious and has had trouble tolerating this anxiety overnight.   On interview, the patient is found sitting on his bed stating that he's "scared." He says that the SI "pops up here and there" but has no intent or plan. He feels hopeless because "nothing helps." He denies any trouble with sleep or appetite. He's been considering breaking up with his girlfriend because of feeling overwhelmed.  As per  ED eval:  "Patient is a 22 y/o single male, college student in [employer], living at the dorms in college, with past psychiatric history of OCD, MDD and anxiety, no past inpatient admission, no past SA/NSSIB, currently in treatment with PCP but not psychiatrist, use of daily MJ, no past legal or violent history, no trauma history, no significant medical history who BIB self after worsening OCD symptoms for the last week in the context of recent discontinuation of medication.     Patient inconsolably crying and distress during the interview. Patient reports being diagnosed from OCD since age 11y/o. He reports being in treatment since then with Prozac, which has been prescribed by PCP who recently retired and gave pt a 1 year supply of prozac 60mg and Wellbutrin XL 150mg. After 1 year elapsed, pt then had no meds and stopped for around 1-2 months, during which time he felt mildly anxious but overall well and pretty much at his baseline. After 1.5 or so months of no meds, pt went to see an NP at a psych urgent care, who then provided prescriptions of 60mg of prozac and Wellbutrin, but pt was not instructed to titrate slowly. After resuming Prozac 60mg, patient fairly quick began having significant worsening of anxiety. He reports since Saturday he started to experience intrusive violent images about stabbing/cutting self and family (mother and girlfriend) as well as shooting them with a gun (which denies having) after watching in the new a case were a  killed his wife. Patient reports these images happen most of the time in the day and are severely distressing to him as these "do not represent who he is" and is very scared. Patient states he has been isolating himself from friends, breathing and cracking fingers to get distracted from images. He reports feeling sad, anxious, not enjoying things as before, not going to school since Monday or to football practices. He reports poor sleep as he has been waking up with anxiety related to the thoughts and has poor appetite. Patient also reports passive thoughts about ending his life but states knowing "this is not the solution" and would never to anything to hurt himself or others. Patient denies any AH/VH, paranoia or referential thinking.     Collateral [place]: She reports patient goes to [employer] and has a scholarship in football. She reports patient was diagnosed of OCD, depression, anxiety when he was 11 y/o and was following up with pediatrician. She reports patient used to have obsessions related to numbers and patterns but now intrusive thoughts change depending on the situation. She reports being originally from [place], and states pediatrician retired after following patient for 20 years. She state patient stopped medication 4-5 weeks, but when back 1.5 weeks ago. She states patient is on Prozac 60mg PO and Wellbutrin (unclear about the dose). She reports on Monday, he called her stating he was having thoughts, fearing hurting someone (particularly girlfriend) or self, not being able to function in school or job. She states patient has been stating being scared and not fully trusting self, but denies any passive or active SI thoughts. Last episode during Covid, but did not went to the ED. Patient was never being admitted to IPP unit before. She states patient has an appointment with a psychiatrist Dr. Alexandra Olvera this Wednesday."    As per nursing, pt is extremely anxious and has had trouble tolerating this anxiety overnight.   On interview, the patient is found sitting on his bed stating that he's "scared." He says that the SI "pops up here and there" but has no intent or plan. He feels hopeless because "nothing helps." He denies any trouble with sleep or appetite. He's been considering breaking up with his girlfriend because of feeling overwhelmed.  Patient is a 22 y/o single male, college student in [employer], living at the dorms in college, with past psychiatric history of OCD, MDD and anxiety, no past inpatient admission, no past SA but with remote hx of NSSIB (cutting thighs a couple of years ago), currently in treatment with PCP but not psychiatrist, use of daily MJ, no past legal or violent history, no trauma history, no significant medical history who BIB self after worsening OCD symptoms for the last week in the context of recent discontinuation of medication.     Patient inconsolably crying and distress during the interview. Patient reports being diagnosed from OCD since age 9y/o. He reports being in treatment since then with Prozac, which has been prescribed by PCP who recently retired and gave pt a 1 year supply of prozac 60mg and Wellbutrin XL 150mg. After 1 year elapsed, pt then had no meds and stopped for around 1-2 months, during which time he felt mildly anxious but overall well and pretty much at his baseline. After 1.5 or so months of no meds, pt went to see an NP at a psych urgent care, who then provided prescriptions of 60mg of prozac and Wellbutrin, but pt was not instructed to titrate slowly. After resuming Prozac 60mg, patient fairly quick began having significant worsening of anxiety. He reports since Saturday he started to experience intrusive violent images about stabbing/cutting self and family (mother and girlfriend) as well as shooting them with a gun (which denies having) after watching in the new a case were a  killed his wife. Patient reports these images happen most of the time in the day and are severely distressing to him as these "do not represent who he is" and is very scared. Patient states he has been isolating himself from friends, breathing and cracking fingers to get distracted from images. He reports feeling sad, anxious, not enjoying things as before, not going to school since Monday or to football practices. He reports poor sleep as he has been waking up with anxiety related to the thoughts and has poor appetite. Patient also reports passive thoughts about ending his life but states knowing "this is not the solution" and would never to anything to hurt himself or others. Patient denies any AH/VH, paranoia or referential thinking.     Collateral [place]: She reports patient goes to [employer] and has a scholarship in football. She reports patient was diagnosed of OCD, depression, anxiety when he was 9 y/o and was following up with pediatrician. She reports patient used to have obsessions related to numbers and patterns but now intrusive thoughts change depending on the situation. She reports being originally from [place], and states pediatrician retired after following patient for 20 years. She state patient stopped medication 4-5 weeks, but when back 1.5 weeks ago. She states patient is on Prozac 60mg PO and Wellbutrin (unclear about the dose). She reports on Monday, he called her stating he was having thoughts, fearing hurting someone (particularly girlfriend) or self, not being able to function in school or job. She states patient has been stating being scared and not fully trusting self, but denies any passive or active SI thoughts. Last episode during Covid, but did not went to the ED. Patient was never being admitted to IPP unit before. She states patient has an appointment with a psychiatrist Dr. Alexandra Olvera this Wednesday."    As per nursing, pt is extremely anxious and has had trouble tolerating this anxiety overnight.   On interview, the patient is found sitting on his bed stating that he's "scared." He says that the SI "pops up here and there" but has no intent or plan. He feels hopeless because "nothing helps." He denies any trouble with sleep or appetite. He's been considering breaking up with his girlfriend because of feeling overwhelmed.

## 2024-03-27 NOTE — BH INPATIENT PSYCHIATRY DISCHARGE NOTE - NSBHMETABOLIC_PSY_ALL_CORE_FT
BMI:   HbA1c:   Glucose:   BP: --Vital Signs Last 24 Hrs  T(C): 36.4 (03-26-24 @ 21:08), Max: 36.4 (03-26-24 @ 21:08)  T(F): 97.5 (03-26-24 @ 21:08), Max: 97.5 (03-26-24 @ 21:08)  HR: --  BP: --  BP(mean): --  RR: --  SpO2: --    Orthostatic VS  03-26-24 @ 06:45  Lying BP: --/-- HR: --  Sitting BP: 132/76 HR: 60  Standing BP: 107/70 HR: 93  Site: --  Mode: --  Orthostatic VS  03-25-24 @ 08:24  Lying BP: --/-- HR: --  Sitting BP: 137/89 HR: 79  Standing BP: 122/77 HR: 97  Site: --  Mode: --    Lipid Panel:

## 2024-03-27 NOTE — BH DISCHARGE NOTE NURSING/SOCIAL WORK/PSYCH REHAB - NSDCPRGOAL_PSY_ALL_CORE
During the current hospitalization, patient has been addressing psychiatric rehabilitation goals pertaining to demonstrating use of a new coping techniques to minimize the negative effects of obsessions and compulsions on socialization, activities and mood. Patient has demonstrated progress towards psychiatric rehabilitation goals during the current hospitalization. Patient exhibited progress through participating in individual and group therapy and developing additional coping skills to assist with managing negative emotions such as listening to music, coloring, working out, and watching TV. Writer encouraged patient to continue to strengthen and practice effective skills. Patient was receptive. Patient attended approximately 99 percent of psychiatric rehabilitation groups. Patient met goal of identifying coping skills by reporting these skills have helped him during current hospitalization. Patient reports he will continue to practice coping skills. Patient reports overall improvement in mood. Patient reports feeling "happy" to go home. Patient reports he is looking forward to seeing his family and friends. Patient completed safety plan with unit psychologist. Patient was compliant with medication during current hospitalization. Patient was visible on the unit and was appropriate with peers and staff. Nursing will provide patient with a Press Ganey survey prior to discharge.

## 2024-03-27 NOTE — BH INPATIENT PSYCHIATRY DISCHARGE NOTE - NSBHDCHANDOFFFT_PSY_ALL_CORE
Handoff including circumstance surrounding admission, past hx, hospital course, medications, and after care recs provided to Dr Montemayor, if questions regarding hospital course or patient come up following dc, please contact [facility] team at [facility] at [phone]. or Dr. RAGLAND [phone]

## 2024-03-27 NOTE — BH INPATIENT PSYCHIATRY DISCHARGE NOTE - MSE UNSTRUCTURED FT
Appearance: Patient appears stated age, fair-grooming, fair hygiene, dressed casually.   Behavior: Cooperative during interview, appropriate eye contact.   Speech: Normal rate, productivity and tone.   Motor: No PMA/R.  Gait: normal, ambulating without issue  Mood: "good"   Affect: brighter, reactive, mood congruent.   Thought Process: Linear, goal directed  Thought Content: patient did not report active SI/ HI/VI, denies urges to self-injure.   Perceptual: Denies sensory disturbances.   Cognitive: aaox3, attention/concentration intact. Memory: intact   Insight: good  Judgement: good  Impulse control: intact on unit. Appearance: Patient appears stated age, fair-grooming, fair hygiene, dressed casually.   Behavior: Cooperative during interview, appropriate eye contact.   Speech: Normal rate, productivity and tone.   Motor: No PMA/R.  Gait: normal, ambulating without issue  Mood: "good"   Affect: brighter, reactive, mood congruent.   Thought Process: Linear, goal directed  Thought Content: denies active or passive SI/ HI/VI, denies urges to self-injure.   Perceptual: Denies sensory disturbances.   Cognitive: aaox3, attention/concentration intact. Memory: intact   Insight: good  Judgement: good  Impulse control: intact on unit.

## 2024-03-27 NOTE — BH INPATIENT PSYCHIATRY DISCHARGE NOTE - NSDCCPCAREPLAN_GEN_ALL_CORE_FT
PRINCIPAL DISCHARGE DIAGNOSIS  Diagnosis: Severe anxiety with panic  Assessment and Plan of Treatment:       SECONDARY DISCHARGE DIAGNOSES  Diagnosis: OCD (obsessive compulsive disorder)  Assessment and Plan of Treatment:

## 2024-03-27 NOTE — BH INPATIENT PSYCHIATRY DISCHARGE NOTE - HOSPITAL COURSE
Dates of Hospitalization:     On presentation the unit the patient?  Given presenting symptoms and context surrounding admit, patient was XXXX    Risk benefit of medications was discussed, discussion regarding risks included but was not limited to NMS, TD, metabolic disturbances, and conduction abnormalities. The patient was in agreement with plan to start XXX??, which was titrated to XXX    Over the course of hospitalization the patient????      On the day of dc the patient was no longer???     At the time of dc the patient and family were engaged in safety planning and denied safety concerns related to dispo, they verbalized willingness to call 911 or go to ER in the event that patient was felt to be a threat to self or others.      Risk Assessment:       Discharge Diagnosis:     Discharge Medications (the patient was provided with a 14 day supply of psychiatric medications upon dc):     clonazePAM 0.5 mg oral tablet: 1 tab(s) orally once a day (at bedtime) as needed for  anxiety MDD: 0.5mg  escitalopram 5 mg oral tablet: 1 tab(s) orally once a day   Dates of Hospitalization: 3/22/24-3/27/24    Given suspicion that acute presentation was overwhelmingly related to acute effects of accidental rapid dose escalation of prozac, akathisia on exam, and that patient was well off meds earlier this month the prozac and Wellbutrin were DC on arrival on the unit, and pt was given 1x dose of Klonopin 1mg with rapid improvements in sx. As such patient was started on klonopin 0.5mg BID and given time to wash out meds. Each day after dc of prozac and Wellbutrin patient had considerable improvements in presenting sx and by day 3-4 off meds had seemingly returned to baseline level of anxiety/OCD thoughts. He was relieved to have improved quickly and given recent issues around med side effects it was thought best to begin Lexapro titration in hospital to ensure there was no re-emergence of sx related to SSRI tx. Pt tolerated well with no reccurrence, but out of precaution dose was kept at 5mg to allow pt to adjust to dose before it would be further titrated outpatient.     At the time of dc patient had no further thoughts of harm to self or others and no distressing thoughts that he could not divert or control. He was looking forward to resuming football and school and was thus dc to outpatient f/u at [facility].    At the time of dc the patient and family were engaged in safety planning and denied safety concerns related to dispo, they verbalized willingness to call 911 or go to ER in the event that patient was felt to be a threat to self or others.      Risk Assessment: The patient is at elevated chronic risk for suicide/self harm (not violence) but low acute risk for suicide/self harm/violence. Static risk factors include; psychiatric illness dx, OCD, hx of hospitalization, hx of SIB, but pt is without history of SA, violence or notable substance use. Acute risk factors present on admission but mitigated during hospitalization include; severe anxiety, ego-dystonic intrusive thoughts of harm to self or others, sleep disturbance, no active tx providers. Acute risk factors were mitigated during admission via medication tx (stopping offending meds and tx with Klonopin for acute sx), I/G/M therapy, safety planning, and psychoeducation. Protective factors include; good response to tx, forward thinking regarding football, school, being w family and girlfriend, pt has good supports in community, and has had a great response to tx. Given protective factors, and that acute risk factors present on admission have been addressed and are no longer present at dc, patient has returned to baseline level of acute risk and the patient no longer requires inpatient hospitalization for stabilization or safety. The patient is therefore appropriate for dc to outpatient care. Chronic risk can be further mitigated by continued engagement with outpatient tx, titration of Lexapro, and therapy.  At the time of dc the patient engaged meaningfully in safety planning and was dc home to outpatient tx.     Discharge Diagnosis: Iatrogenic Anxious crisis (prozac induced 2/2 accidental rapid titration)    Discharge Medications:     clonazePAM 0.5 mg oral tablet: 1 tab(s) orally once a day (at bedtime) as needed for  anxiety MDD: 0.5mg (given 5 day supply and instructed to take as needed)  escitalopram 5 mg oral tablet: 1 tab(s) orally once a day (plan to titrate if tolerating well at at outpatient visit)

## 2024-03-27 NOTE — BH DISCHARGE NOTE NURSING/SOCIAL WORK/PSYCH REHAB - NSCDUDCCRISIS_PSY_A_CORE
[facility] Well  [phone] [facility]-WELL ([phone])  Text "WELL" to [phone]  Website: www.[employer]/.[facility] 1 (546) 621-HOPE (0646) Website: www.safehorizon.org/.  [facility] - [facility] – Crisis Care for Children, Adults and Families  [address] [place] [address]  Mobile Crisis Hotline – (335) 544-8646/.National Suicide Prevention Lifeline 4 (477) 856-9854/.  [facility]  1 (659) LIFENET (719-3180)/.  [facility]  (321) 429-1665/.  [facility] Behavioral Health Helpline / [facility] Mobile Crisis  (657) 476-QTZV (9647)/.  [facility] Response Crisis Hotline  (705) 287-6532  24 hour telephone crisis intervention and suicide prevention hotline concerned with all mental health issues/.  [facility]  [address] [place] [address] [phone]   Hours:  Monday through Friday from 9 AM to 3 PM/988 Suicide and Crisis Lifeline

## 2024-03-27 NOTE — BH INPATIENT PSYCHIATRY DISCHARGE NOTE - NSBHASSESSSUMMFT_PSY_ALL_CORE
Regular rate and rhythm, Heart sounds S1 S2 present, no murmurs, rubs or gallops Patient is a 24 y/o single male, college student in [employer], living at the dorms in college, with past psychiatric history of OCD, MDD and anxiety, no past inpatient admission, no past SA/NSSIB, currently in treatment with PCP but not psychiatrist, use of daily MJ, no past legal or violent history, no trauma history, no significant medical history who BIB self after worsening OCD symptoms for the last week in the context of recent discontinuation of medication.     Patient presenting violent intrusive thoughts of hurting self and family with depressive and anxious mood for the last wee. Pt was off meds for 1.5-2months and then resumed without titration (prozac 60mg likely offending agent) this led to severe anxiety, akathisia, and intrusive thoughts of harm to self and others all of ego dystonic character. Since dc of wellbutrin and prozac and starting Klonopin pt is doing significantly better, starting lexapro trial, tapering klonopin.     PLAN:  - Admit patient 9.13   - No CO 1:1  - psychiatry:   -change Klonopin to 0.5mg HS PRN,   - c/w lexapro 5mg daily for mood/anxiety/ocd  - PRNs: Atarax 50mg PO q6 for anxiety, Ativan 2mg PO/IM q6 for severe anxiety/agitation.  -colat: discussed dx and tx plan with mother [place] by phone today   -Discharge: working towards BHCP intake with plan for dc likely Wednesday this week.

## 2024-03-27 NOTE — BH INPATIENT PSYCHIATRY DISCHARGE NOTE - REASON FOR ADMISSION
You were admitted for worsening of intrusive thoughts/images of harming yourself and others (ego dystonic), and severe anxiety related to medication side effects.

## 2024-03-27 NOTE — BH INPATIENT PSYCHIATRY DISCHARGE NOTE - NSDCMRMEDTOKEN_GEN_ALL_CORE_FT
clonazePAM 0.5 mg oral tablet: 1 tab(s) orally once a day (at bedtime) as needed for  anxiety MDD: 0.5mg  escitalopram 5 mg oral tablet: 1 tab(s) orally once a day

## 2024-03-27 NOTE — BH DISCHARGE NOTE NURSING/SOCIAL WORK/PSYCH REHAB - DISCHARGE INSTRUCTIONS AFTERCARE APPOINTMENTS
In order to check the location, date, or time of your aftercare appointment, please refer to your Discharge Instructions Document given to you upon leaving the hospital.  If you have lost the instructions please call [phone]

## 2024-03-27 NOTE — BH DISCHARGE NOTE NURSING/SOCIAL WORK/PSYCH REHAB - PATIENT PORTAL LINK FT
You can access the FollowMyHealth Patient Portal offered by [facility] by registering at the following website: [email or web address]. By joining [facility]’s FollowMyHealth portal, you will also be able to view your health information using other applications (apps) compatible with our system.

## 2024-03-27 NOTE — BH INPATIENT PSYCHIATRY DISCHARGE NOTE - NSBHFUPINTERVALHXFT_PSY_A_CORE
3/23/2022    Patient: Liliane Armendariz   YOB: 1929   Date of Visit: 3/23/2022     Brii Fishman MD  [address] [place] [address] [address] [address]  Via In Basket    Dear Brii Fishman MD,      Thank you for referring Mr. Liliane Armendariz to [address] [place] [address] for evaluation. My notes for this consultation are attached. If you have questions, please do not hesitate to call me. I look forward to following your patient along with you.       Sincerely,    Nam Acharya, DO Chart reviewed case discussed w team. No acute events over night, patient continues to sustain improvements, denies current intrusive thoughts, notable anxiety or SI/HI. Tolerating lexapro 5mg well, given recent events leading to admit, will plan for slow titration to be completed by outpatient team. Pt engaged in therapy and safety planning, reports mood is good, wants to get back to routines including Football. Will change HS klonopin to PRN and aim for dc tomorrow to [facility].  Trendelenburg Trendelenburg supine supine supine supine Chart reviewed case discussed w team. No acute events over night, patient continues to sustain improvements, slept well, denies current intrusive thoughts, denies any significant anxiety, has some mild worry that when he goes home and encounters stressors his thoughts will return, stressed and provided psychoed re thought action fusion and illogicality of these thoughts, also pointed out that despite him having said thoughts near constantly prior to admit, he never actually acted on them and was repulsed by the idea of acting on them. He takes well to this discussion. Feels ready to go and resume football and school related activities. Looks forward to linking with treatment at [facility]. Denies SI/HI/VI. Tolerating Lexapro well, discussed risks related to bzds and etoh including risk of death, pt verbalized understanding that if he does drink on a given night he will skip the klonopin dose though he intends to avoid ETOH for some time and does not have problematic use.

## 2024-03-27 NOTE — BH INPATIENT PSYCHIATRY DISCHARGE NOTE - NSBHFUPINTERVALCCFT_PSY_A_CORE
Patient seen on day of dc for follow up for anxiety   Discharge Progress Note Date and Time: 03-27-24 @ 08:06

## 2024-03-27 NOTE — BH DISCHARGE NOTE NURSING/SOCIAL WORK/PSYCH REHAB - NSBHDCADDR1FT_A_CORE
[facility] [place]  [id number] [address] [place] [address] [address]

## 2024-04-11 DIAGNOSIS — F33.9 MAJOR DEPRESSIVE DISORDER, RECURRENT, UNSPECIFIED: ICD-10-CM
